# Patient Record
Sex: FEMALE
[De-identification: names, ages, dates, MRNs, and addresses within clinical notes are randomized per-mention and may not be internally consistent; named-entity substitution may affect disease eponyms.]

---

## 2019-11-12 ENCOUNTER — APPOINTMENT (OUTPATIENT)
Dept: OTOLARYNGOLOGY | Facility: CLINIC | Age: 54
End: 2019-11-12
Payer: COMMERCIAL

## 2019-11-12 VITALS
SYSTOLIC BLOOD PRESSURE: 132 MMHG | HEIGHT: 66 IN | WEIGHT: 160 LBS | HEART RATE: 65 BPM | BODY MASS INDEX: 25.71 KG/M2 | DIASTOLIC BLOOD PRESSURE: 82 MMHG

## 2019-11-12 DIAGNOSIS — Z78.9 OTHER SPECIFIED HEALTH STATUS: ICD-10-CM

## 2019-11-12 DIAGNOSIS — Z87.891 PERSONAL HISTORY OF NICOTINE DEPENDENCE: ICD-10-CM

## 2019-11-12 DIAGNOSIS — Z82.49 FAMILY HISTORY OF ISCHEMIC HEART DISEASE AND OTHER DISEASES OF THE CIRCULATORY SYSTEM: ICD-10-CM

## 2019-11-12 DIAGNOSIS — Z85.42 PERSONAL HISTORY OF MALIGNANT NEOPLASM OF OTHER PARTS OF UTERUS: ICD-10-CM

## 2019-11-12 PROBLEM — Z00.00 ENCOUNTER FOR PREVENTIVE HEALTH EXAMINATION: Status: ACTIVE | Noted: 2019-11-12

## 2019-11-12 PROCEDURE — 99203 OFFICE O/P NEW LOW 30 MIN: CPT | Mod: 25

## 2019-11-12 PROCEDURE — 69210 REMOVE IMPACTED EAR WAX UNI: CPT

## 2019-11-12 RX ORDER — CIPROFLOXACIN AND DEXAMETHASONE 3; 1 MG/ML; MG/ML
0.3-0.1 SUSPENSION/ DROPS AURICULAR (OTIC) TWICE DAILY
Qty: 1 | Refills: 1 | Status: ACTIVE | COMMUNITY
Start: 2019-11-12 | End: 1900-01-01

## 2019-11-12 NOTE — HISTORY OF PRESENT ILLNESS
[de-identified] : KAMILA HARDWICK has a history of ceruman impaction. Right ear pain and blockage for the past several days.  Patient felt that her ears were closing and had put in debrox, given her history of cerumen impaction. Patient woke up this morning with pain. Patient reports of decreased hearing in the right ear. Patient reports of otorrhea. No Tinnitus and no vertigo reported at this visit. \par Past history of recurrent otitis media and myringotomies as a child.\par

## 2019-11-12 NOTE — CONSULT LETTER
[FreeTextEntry2] : Dear GIULIA OLMEDO  [Please see my note below.] : Please see my note below. [FreeTextEntry1] : Thank you for allowing me to participate in the care of KAMILA HARDWICK .\par Please see the attached visit note.\par \par \par \par Andrew Mendoza\par Otology\par Department of Otolaryngology\par [facility]

## 2019-11-12 NOTE — REASON FOR VISIT
[Ear Pain] : ear pain [Initial Evaluation] : an initial evaluation for [Hearing Loss] : hearing loss

## 2019-11-12 NOTE — ASSESSMENT
[FreeTextEntry1] : Ear hygiene reviewed in detail. Otic drops recommended for 5-7 days with followup at that time if symptoms do not fully resolve. Routine follow up recommended for cerumen management.

## 2019-11-12 NOTE — PHYSICAL EXAM
[FreeTextEntry1] : Microscopic ear exam with cerumen debridement:\par \par Right ear: Obstructing cerumen was debrided from the ear canal using suction. Underlying purulence and keratin debris good with suction. The ear canal was otherwise within normal limits.  The tympanic membrane was intact and noninflamed.\par \par Left ear: Obstructing cerumen was debrided from the ear canal using suction, and curets.  The ear canal was otherwise within normal limits.  The tympanic membrane was intact and noninflamed.\par  [Normal] : lingual tonsils are normal [Midline] : trachea located in midline position

## 2019-11-15 RX ORDER — CLOTRIMAZOLE 10 MG/ML
1 SOLUTION TOPICAL
Qty: 30 | Refills: 0 | Status: ACTIVE | COMMUNITY
Start: 2019-11-15 | End: 1900-01-01

## 2019-11-19 ENCOUNTER — APPOINTMENT (OUTPATIENT)
Dept: OTOLARYNGOLOGY | Facility: CLINIC | Age: 54
End: 2019-11-19
Payer: COMMERCIAL

## 2019-11-19 VITALS
DIASTOLIC BLOOD PRESSURE: 75 MMHG | WEIGHT: 160 LBS | HEART RATE: 69 BPM | HEIGHT: 66 IN | BODY MASS INDEX: 25.71 KG/M2 | SYSTOLIC BLOOD PRESSURE: 128 MMHG

## 2019-11-19 DIAGNOSIS — H61.23 IMPACTED CERUMEN, BILATERAL: ICD-10-CM

## 2019-11-19 PROCEDURE — 69210 REMOVE IMPACTED EAR WAX UNI: CPT

## 2019-11-19 PROCEDURE — 99213 OFFICE O/P EST LOW 20 MIN: CPT | Mod: 25

## 2019-11-19 NOTE — ASSESSMENT
[FreeTextEntry1] : Continued but improved inflammation of the right external ear canal. This may represent a mixed yudy infection including for fungus.The tympanic membrane appears inflamed as well. \par \par I have continued The use of alternating antifungal and antibiotic otic drops for 5 additional days and have recommended followup in 2 weeks.\par \par \par I have reviewed the management options in detail with the patient. I have recommended early followup if symptoms persist or progress.

## 2019-11-19 NOTE — PHYSICAL EXAM
[Midline] : trachea located in midline position [Normal] : mucosa is normal [FreeTextEntry1] : Microscopic ear exam with cerumen debridement:\par \par Right ear: Obstructing cerumen was debrided from the ear canal using suction, and curet.  Mild to moderate inflammation of the ear canal remains. The tympanic membrane is partially replaced by granulation tissue. Intactness cannot be confirmed. \par \par Left ear: Obstructing cerumen was debrided from the ear canal using suction, and curets.  The ear canal was otherwise within normal limits.  The tympanic membrane was intact and noninflamed.\par

## 2019-11-19 NOTE — CONSULT LETTER
[Please see my note below.] : Please see my note below. [FreeTextEntry1] : Thank you for allowing me to participate in the care of KAMILA HARDWICK .\par Please see the attached visit note.\par \par \par \par Andrew Mendoza\par Otology\par Department of Otolaryngology\par [facility]  [FreeTextEntry2] : Dear GILUIA OLMEDO

## 2019-12-03 ENCOUNTER — APPOINTMENT (OUTPATIENT)
Dept: OTOLARYNGOLOGY | Facility: CLINIC | Age: 54
End: 2019-12-03
Payer: COMMERCIAL

## 2019-12-03 VITALS — BODY MASS INDEX: 25.71 KG/M2 | WEIGHT: 160 LBS | HEIGHT: 66 IN

## 2019-12-03 PROCEDURE — 99213 OFFICE O/P EST LOW 20 MIN: CPT | Mod: 25

## 2019-12-03 PROCEDURE — 69210 REMOVE IMPACTED EAR WAX UNI: CPT

## 2019-12-03 RX ORDER — TOBRAMYCIN AND DEXAMETHASONE 3; 1 MG/ML; MG/ML
0.3-0.1 SUSPENSION/ DROPS OPHTHALMIC
Qty: 1 | Refills: 0 | Status: ACTIVE | COMMUNITY
Start: 2019-12-03 | End: 1900-01-01

## 2019-12-03 NOTE — ASSESSMENT
[FreeTextEntry1] : Persistent inflammation of the right ear canal debridement today. A culture was taken.\par \par Ear hygiene reviewed. Continued use of antibiotic otic drops and antifungal recommended. Trial with TobraDex drops recommended.\par \par Followup in 2 weeks.

## 2019-12-03 NOTE — PHYSICAL EXAM
[Normal] : mucosa is normal [Midline] : trachea located in midline position [FreeTextEntry1] : Microscopic ear exam with cerumen debridement:\par \par Right ear: Obstructing cerumen was debrided from the ear canal using suction, and curet.  Mild to moderate inflammation of the ear canal remains. No tympanic membrane granulation seen but purulence noted on the TM anteriorly.  Intactness cannot be confirmed. \par culture taken\par Left ear: Obstructing cerumen was debrided from the ear canal using suction, and curets.  The ear canal was otherwise within normal limits.  The tympanic membrane was intact and noninflamed.\par

## 2019-12-03 NOTE — CONSULT LETTER
[Please see my note below.] : Please see my note below. [FreeTextEntry2] : Dear GIULIA OLMEDO  [FreeTextEntry1] : Thank you for allowing me to participate in the care of KAMILA HARDWICK .\par Please see the attached visit note.\par \par \par \par Andrew Mendoza\par Otology\par Department of Otolaryngology\par [facility]

## 2019-12-03 NOTE — HISTORY OF PRESENT ILLNESS
[de-identified] : KAMILA HARDWICK has a history of ceruman impaction and otitis externa, possibley with fungus infection. \par Past history of recurrent otitis media and myringotomies as a child.\par  [FreeTextEntry1] : 12/03/2019\par Patient is being seen for a follow up of visit for otitis externa. Patient reports of feeling clogged. Patient reports of intermittent fullness. No pain reported. Partial compliance with otic drops. \par

## 2019-12-10 LAB — BACTERIA SPEC CULT: ABNORMAL

## 2019-12-17 ENCOUNTER — APPOINTMENT (OUTPATIENT)
Dept: OTOLARYNGOLOGY | Facility: CLINIC | Age: 54
End: 2019-12-17
Payer: COMMERCIAL

## 2019-12-17 VITALS
SYSTOLIC BLOOD PRESSURE: 118 MMHG | DIASTOLIC BLOOD PRESSURE: 77 MMHG | BODY MASS INDEX: 25.71 KG/M2 | HEIGHT: 66 IN | HEART RATE: 62 BPM | WEIGHT: 160 LBS

## 2019-12-17 DIAGNOSIS — H60.311 DIFFUSE OTITIS EXTERNA, RIGHT EAR: ICD-10-CM

## 2019-12-17 DIAGNOSIS — H61.22 IMPACTED CERUMEN, LEFT EAR: ICD-10-CM

## 2019-12-17 PROCEDURE — 69210 REMOVE IMPACTED EAR WAX UNI: CPT

## 2019-12-17 PROCEDURE — 99213 OFFICE O/P EST LOW 20 MIN: CPT | Mod: 25

## 2019-12-17 NOTE — HISTORY OF PRESENT ILLNESS
[de-identified] : KAMILA HARDWICK has a history of ceruman impaction and otitis externa, possibly with fungus infection. \par Past history of recurrent otitis media and myringotomies as a child.\par  [FreeTextEntry1] : 12/17/2019 \par Patient is being seen for a follow up of visit for otitis externa. Patient reports that her ears feel better. No pain reported. Compliance with otic drops reported. \par

## 2019-12-17 NOTE — CONSULT LETTER
[Please see my note below.] : Please see my note below. [FreeTextEntry2] : Dear GIULIA OLMEDO  [FreeTextEntry1] : Thank you for allowing me to participate in the care of KAMILA HARDWICK .\par Please see the attached visit note.\par \par \par \par Andrew Mendoza\par Otology\par Department of Otolaryngology\par [facility]

## 2019-12-17 NOTE — PHYSICAL EXAM
[FreeTextEntry1] : Microscopic ear exam with cerumen debridement:\par \par Right ear: The ear canal was patent and nonobstructed.  The tympanic membrane was intact and noninflamed.\par \par Left ear: Obstructing cerumen was debrided from the ear canal using suction, and curet.  The ear canal was otherwise within normal limits.  The tympanic membrane was intact and noninflamed. [Normal] : mucosa is normal [Midline] : trachea located in midline position

## 2019-12-17 NOTE — ASSESSMENT
[FreeTextEntry1] : Culture results reviewed with the patient.\par \par Discontinue otic drops in the right ear. I have recommended 2 days of drops in the left ear due to mild inflammation noted with the cerumen impaction. We reviewed ear hygiene in detail. I have recommended followup on a routine basis for maintenance and as needed if symptoms arise.

## 2020-01-06 LAB — FUNGUS SPEC CULT ORG #8: NORMAL

## 2025-07-18 NOTE — HISTORY OF PRESENT ILLNESS
[de-identified] : KAMILA HARDWICK has a history of ceruman impaction. Right ear pain and blockage for the past several days.  Patient felt that her ears were closing and had put in debrox, given her history of cerumen impaction. Patient woke up this morning with pain. Patient reports of decreased hearing in the right ear. Patient reports of otorrhea. No Tinnitus and no vertigo reported at this visit. \par Past history of recurrent otitis media and myringotomies as a child.\par  [FreeTextEntry1] : 11/19/2019 \par Called for ear pain a few days ago, and started on alternating anti fungal drops.  patient reports of feeling much better. Patient was compliant with ciprodex.  Hearing is back to normal. Garcia Hayes MD: Patient escalated to main after chart review shows history of constant observation for alcohol/behavioral related issues.